# Patient Record
Sex: MALE | URBAN - METROPOLITAN AREA
[De-identification: names, ages, dates, MRNs, and addresses within clinical notes are randomized per-mention and may not be internally consistent; named-entity substitution may affect disease eponyms.]

---

## 2020-04-09 PROBLEM — 14760008 CONSTIPATION: Status: ACTIVE | Noted: 2020-04-09

## 2021-09-16 ENCOUNTER — OFFICE VISIT (OUTPATIENT)
Dept: URBAN - METROPOLITAN AREA CLINIC 23 | Facility: CLINIC | Age: 36
End: 2021-09-16

## 2021-10-15 ENCOUNTER — OFFICE VISIT (OUTPATIENT)
Dept: URBAN - METROPOLITAN AREA CLINIC 35 | Facility: CLINIC | Age: 36
End: 2021-10-15

## 2021-10-15 RX ORDER — POLYETHYLENE GLYCOL 3350, SODIUM SULFATE ANHYDROUS, SODIUM BICARBONATE, SODIUM CHLORIDE, POTASSIUM CHLORIDE 236; 22.74; 6.74; 5.86; 2.97 G/4L; G/4L; G/4L; G/4L; G/4L
AS DIRECTED POWDER, FOR SOLUTION ORAL
Qty: 1 GALLON | Refills: 0 | Status: ON HOLD | COMMUNITY
Start: 2020-04-09

## 2021-10-15 RX ORDER — ALBUTEROL SULFATE 90 UG/1
1 PUFF AS NEEDED AEROSOL, METERED RESPIRATORY (INHALATION)
COMMUNITY

## 2021-10-15 RX ORDER — LORATADINE 10 MG/1
1 TABLET TABLET ORAL ONCE A DAY
Qty: 30 | COMMUNITY

## 2021-10-15 NOTE — HPI-MIGRATED HPI
;   ;     Abdominal Pain : This is a 36 year old male patient presents for abdominal pain consultation. Patient admits he has been experiencing symptoms since ____. Patient describes symptoms as ____.  Patient states symptoms are located ___.  Patient admits symptoms are more present during /after ____. Patient admits /denies nausea or vomiting. Patient admits /denies having any recent imaging.  Patient admits/denies EGD in the past.   ;   Constipation : Last Visit (04/09/2020)35 year old male patient presents for constipation consult via [facility] with consent . Patient states symptoms began approximately 2 months ago. Patient admits trying some OTC medications  for relief but never consistent results . Patient admits having maybe 1 bowel movement a day. Patient admits stools are hard and intermittently pebble like.   Patient denies rectal bleeding /melena.  Patient admits strenuous bowel movements.   Patient denies any associated abdominal pain.   Patient had hx of anal fissures approximately 5 years ago  which he had surgically repaired .;